# Patient Record
Sex: FEMALE | Race: WHITE
[De-identification: names, ages, dates, MRNs, and addresses within clinical notes are randomized per-mention and may not be internally consistent; named-entity substitution may affect disease eponyms.]

---

## 2023-03-11 ENCOUNTER — HOSPITAL ENCOUNTER (EMERGENCY)
Dept: HOSPITAL 76 - ED | Age: 28
Discharge: HOME | End: 2023-03-11
Payer: COMMERCIAL

## 2023-03-11 VITALS — SYSTOLIC BLOOD PRESSURE: 116 MMHG | DIASTOLIC BLOOD PRESSURE: 68 MMHG
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
## 2023-03-11 DIAGNOSIS — S69.91XA: Primary | ICD-10-CM
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
## 2023-03-11 PROCEDURE — 99283 EMERGENCY DEPT VISIT LOW MDM: CPT

## 2023-03-11 NOTE — XRAY REPORT
PROCEDURE:  Hand 3 View RT

 

INDICATIONS:  sports injury/c/o pain/swelling.

 

TECHNIQUE:  4 views of the hand(s) acquired.  

 

COMPARISON:  None.

 

FINDINGS:  

 

Bones:  No fractures or dislocations.  No suspicious bony lesions.  

 

Soft tissues:  No suspicious soft tissue calcifications.  

 

IMPRESSION:  

No trauma found.

 

Reviewed by: Luis Sherman MD on 3/11/2023 11:47 PM PST

Approved by: Luis Sherman MD on 3/11/2023 11:47 PM PST

 

 

Station ID:  [id number]

## 2023-03-11 NOTE — ED PHYSICIAN DOCUMENTATION
PD HPI UPPER EXT INJURY





- Stated complaint


Stated Complaint: R THUMB INJ/PAIN





- Chief complaint


Chief Complaint: Trauma Ext





- History obtained from


History obtained from: Patient





- Additonal information


Additional information: 


Patient is a 27-year-old female Resenting for evaluation of a right thumb injury

that occurred this evening while playing roller Work in [place].  She says she had bumped 

into a another player and her thumb bent backwards.She is right-hand dominant.  

She has pain with range of motion.  She denies prior injury to this hand.She 

denies injury elsewhere.





Review of Systems


Constitutional: denies: Fever


Nose: denies: Congestion


Cardiac: denies: Chest pain / pressure


Respiratory: denies: Dyspnea


GI: denies: Abdominal Pain


Musculoskeletal: reports: Extremity pain


Neurologic: denies: Headache





PD PAST MEDICAL HISTORY





- Past Medical History


Past Medical History: No





- Past Surgical History


Past Surgical History: Yes


General: Appendectomy


Ortho: Other





- Present Medications


Home Medications: 


                                Ambulatory Orders











 Medication  Instructions  Recorded  Confirmed


 


No Known Home Medications  03/11/23 03/11/23














- Allergies


Allergies/Adverse Reactions: 


                                    Allergies











Allergy/AdvReac Type Severity Reaction Status Date / Time


 


cephalexin [From Keflex] Allergy  Emesis Verified 03/11/23 22:53


 


latex Allergy  Rash Verified 03/11/23 22:53














- Social History


Does the pt smoke?: No


Smoking Status: Never smoker


Does the pt drink ETOH?: Yes


ETOH Use: Liquor


Does the pt have substance abuse?: No





- Immunizations


Immunizations are current?: Yes





- POLST


Patient has POLST: No





PD ED PE NORMAL





- General


General: Alert and oriented X 3, No acute distress, Well developed/nourished





- HEENT


HEENT: Atraumatic





- Cardiac


Cardiac: Strong equal pulses





- Respiratory


Respiratory: No respiratory distress





- Derm


Derm: Warm and dry





- Extremities


Extremities: No deformity, No edema, Other (Tenderness to right thumb 

particularly along the medial aspect, Normal range of motion at joints but with 

pain,No snuffbox tenderness, no laxity with stress; Normal sensation, brisk cap 

refill, digit is well-perfused. No tenderness elsewhere in hand, wrist or distal

arm)





Results





- Vitals


Vitals: 


                               Vital Signs - 24 hr











  03/11/23





  22:54


 


Temperature 36.9 C


 


Heart Rate 93


 


Respiratory 16





Rate 


 


Blood Pressure 116/68


 


O2 Saturation 100








                                     Oxygen











O2 Source                      Room air

















PD Medical Decision Making





- ED course


Complexity details: reviewed results, d/w patient


ED course: 


Patient presenting for evaluation of a right thumb injury.Normal neurovascular 

exam To affected area. An x-ray was obtained which I reviewed and I see no signs

of a fracture or dislocation.  Possible concern for ligament injury given 

hyperextension as mechanism.  Patient is placed into a thumb spica and 

instructed on need for follow-up with orthopedic surgeon.She is advised on 

concerning symptoms to return for.








Departure





- Departure


Disposition: 01 Home, Self Care


Clinical Impression: 


 Injury of right thumb





Condition: Stable


Instructions:  ED Sprain Finger


Follow-Up: 


Javier Dash MD [Provider Admit Priv/Credential] - 


Comments: 


Your x-ray does not show a broken or out of place bone.  However you could have 

an injury to a ligament in your thumb especially given the mechanism of your 

injury.We have placed you into a splint which I do recommend you continue to 

use.  I have also sent you home with a small amount of pain medication to use 

only if needed.  Please do not drive while taking this medication as it can be 

sedating.  I would recommend follow-up with An orthopedic doctor and have listed

the name of 1 locally on [facility] that you can call for follow-up. It is 

important to have follow-up especially if you are continuing to have significant

pain. Please continue with anti-inflammatories ice and elevation as this will 

help with swelling and discomfort.


Discharge Date/Time: 03/11/23 23:58